# Patient Record
Sex: FEMALE | Race: WHITE | NOT HISPANIC OR LATINO | Employment: OTHER | URBAN - METROPOLITAN AREA
[De-identification: names, ages, dates, MRNs, and addresses within clinical notes are randomized per-mention and may not be internally consistent; named-entity substitution may affect disease eponyms.]

---

## 2017-01-05 ENCOUNTER — OFFICE VISIT (OUTPATIENT)
Dept: CARDIOLOGY | Facility: MEDICAL CENTER | Age: 72
End: 2017-01-05
Payer: MEDICARE

## 2017-01-05 VITALS — HEART RATE: 70 BPM | OXYGEN SATURATION: 96 % | BODY MASS INDEX: 25.3 KG/M2 | HEIGHT: 66 IN | WEIGHT: 157.4 LBS

## 2017-01-05 DIAGNOSIS — I49.3 VENTRICULAR PREMATURE BEATS: ICD-10-CM

## 2017-01-05 DIAGNOSIS — R07.89 ATYPICAL CHEST PAIN: ICD-10-CM

## 2017-01-05 DIAGNOSIS — R00.2 PALPITATIONS: Chronic | ICD-10-CM

## 2017-01-05 DIAGNOSIS — I49.1 ATRIAL PREMATURE BEATS: ICD-10-CM

## 2017-01-05 PROBLEM — J43.9 PULMONARY EMPHYSEMA (HCC): Status: ACTIVE | Noted: 2017-01-05

## 2017-01-05 PROBLEM — M79.7 FIBROMYALGIA: Status: ACTIVE | Noted: 2017-01-05

## 2017-01-05 PROBLEM — G47.33 OSA (OBSTRUCTIVE SLEEP APNEA): Status: ACTIVE | Noted: 2017-01-05

## 2017-01-05 PROBLEM — J44.9 COPD (CHRONIC OBSTRUCTIVE PULMONARY DISEASE) (HCC): Status: ACTIVE | Noted: 2017-01-05

## 2017-01-05 PROCEDURE — G8427 DOCREV CUR MEDS BY ELIG CLIN: HCPCS | Performed by: INTERNAL MEDICINE

## 2017-01-05 PROCEDURE — 3017F COLORECTAL CA SCREEN DOC REV: CPT | Performed by: INTERNAL MEDICINE

## 2017-01-05 PROCEDURE — G8420 CALC BMI NORM PARAMETERS: HCPCS | Performed by: INTERNAL MEDICINE

## 2017-01-05 PROCEDURE — 1036F TOBACCO NON-USER: CPT | Performed by: INTERNAL MEDICINE

## 2017-01-05 PROCEDURE — 99214 OFFICE O/P EST MOD 30 MIN: CPT | Performed by: INTERNAL MEDICINE

## 2017-01-05 RX ORDER — ATORVASTATIN CALCIUM 10 MG/1
10 TABLET, FILM COATED ORAL NIGHTLY
COMMUNITY

## 2017-01-05 ASSESSMENT — ENCOUNTER SYMPTOMS
SHORTNESS OF BREATH: 0
MYALGIAS: 1
PALPITATIONS: 1

## 2017-01-05 NOTE — MR AVS SNAPSHOT
"Lindsey Bella   2017 10:45 AM   Office Visit   MRN: [id number]    Department:  [facility]   Dept Phone:  [phone]    Description:  Female : 1945   Provider:  Wilfred Castanon M.D.           Reason for Visit     Follow-Up           Allergies as of 2017     No Known Allergies      You were diagnosed with     Atypical chest pain   [438522]       Ventricular premature beats   [746540]       Palpitations   [785.1.ICD-9-CM]       Atrial premature beats   [955462]         Vital Signs     Pulse Height Weight Body Mass Index Oxygen Saturation Smoking Status    70 1.676 m (5' 5.98\") 71.396 kg (157 lb 6.4 oz) 25.42 kg/m2 96% Former Smoker      Basic Information     Date Of Birth Sex Race Ethnicity Preferred Language    1945 Female White Non- English      Your appointments     2017 12:40 PM   PREVIOUS PATIENT with JOSE Donaldson   [facility] (--)    [address], [address]  [facility] [address]   [phone]              Problem List              ICD-10-CM Priority Class Noted - Resolved    Hypertension (Chronic) I10   3/27/2012 - Present    Hyperlipidemia (Chronic) E78.5   3/27/2012 - Present    GERD (gastroesophageal reflux disease) (Chronic) K21.9   3/27/2012 - Present    Family history of colon cancer (Chronic) Z80.0   3/27/2012 - Present    Hot flashes, menopausal (Chronic) N95.1   3/27/2012 - Present    Allergic rhinitis (Chronic)    3/27/2012 - Present    Palpitations (Chronic) R00.2   3/27/2012 - Present    Panic disorder (Chronic) F41.0   3/27/2012 - Present    Routine health maintenance (Chronic) Z00.00   3/27/2012 - Present    Glaucoma (Chronic) H40.9   3/27/2012 - Present    Personal history of fibromyalgia Z87.39   2012 - Present    History of fracture of ankle Z87.81   1/3/2013 - Present    Family history of early CAD Z82.49   1/3/2014 - Present    Abnormal stress ECG with treadmill R94.39   " 2/26/2014 - Present    Abnormal stress echocardiogram R94.39   2/28/2014 - Present    Atrial premature beats I49.1   2/28/2014 - Present    Ventricular premature beats I49.3   2/28/2014 - Present    Atypical chest pain R07.89   1/5/2017 - Present    Pulmonary emphysema (HCC) J43.9   1/5/2017 - Present    COPD (chronic obstructive pulmonary disease) (HCC) J44.9   1/5/2017 - Present    JOSSE (obstructive sleep apnea) G47.33   1/5/2017 - Present    Fibromyalgia M79.7   1/5/2017 - Present      Health Maintenance        Date Due Completion Dates    IMM DTaP/Tdap/Td Vaccine (1 - Tdap) 7/11/1964 ---    PAP SMEAR 7/11/1966 ---    IMM PNEUMOCOCCAL 65+ (ADULT) LOW/MEDIUM RISK SERIES (2 of 2 - PCV13) 3/27/2013 3/27/2012    MAMMOGRAM 4/29/2014 4/29/2013 (Done), 4/29/2013 (Done), 3/14/2012 (Done)    Override on 4/29/2013: Done    Override on 4/29/2013: Done    Override on 3/14/2012: Done (Dr Adames)    IMM INFLUENZA (1) 9/1/2016 ---    BONE DENSITY 3/14/2017 3/14/2012 (Done)    Override on 3/14/2012: Done    COLONOSCOPY 10/14/2017 10/14/2012 (Done)    Override on 10/14/2012: Done (repeat in 5 years/family history of colon cancer/Dr Edwards)            Current Immunizations     Pneumococcal polysaccharide vaccine (PPSV-23) 3/27/2012    SHINGLES VACCINE 3/27/2012      Below and/or attached are the medications your provider expects you to take. Review all of your home medications and newly ordered medications with your provider and/or pharmacist. Follow medication instructions as directed by your provider and/or pharmacist. Please keep your medication list with you and share with your provider. Update the information when medications are discontinued, doses are changed, or new medications (including over-the-counter products) are added; and carry medication information at all times in the event of emergency situations     Allergies:  No Known Allergies          Medications  Valid as of: January 05, 2017 - 11:37 AM    Generic Name Brand  Name Tablet Size Instructions for use    ALPRAZolam (Tab) XANAX 0.5 MG Take 1 Tab by mouth 1 time daily as needed for Anxiety.        Atorvastatin Calcium (Tab) LIPITOR 10 MG Take 10 mg by mouth every evening.        Bimatoprost   205 mg by Ophthalmic route every day. Right eye         Bimatoprost (Solution) LUMIGAN 0.01 %         Calcium Carbonate   Take  by mouth.        Cholecalciferol (Cap) Vitamin D-3 1000 UNITS Take 2,000 Units by mouth.        Coenzyme Q10 (Cap) Coenzyme Q10 100 MG Take  by mouth.        Estradiol (PATCH WEEKLY) CLIMARA 0.025 MG/24HR Apply 1 Patch to skin as directed every 7 days.        Magnesium   Take 350 mg by mouth.        Omega-3 Fatty Acids (Cap) OMEGA 3 FA 1000 MG Take 1,000 mg by mouth.        Pantoprazole Sodium (Tablet Delayed Response) PROTONIX 40 MG Take 1 Tab by mouth every day.        Progesterone Micronized (Cap) PROMETRIUM 100 MG Take 100 mg by mouth every day.        .                 Medicines prescribed today were sent to:     [id number] - [place], [place] - [address] [place] [address] AT [address]    [address] [place] [id number]    Phone: [phone] Fax: [phone]    Open 24 Hours?: No      Medication refill instructions:       If your prescription bottle indicates you have medication refills left, it is not necessary to call your provider’s office. Please contact your pharmacy and they will refill your medication.    If your prescription bottle indicates you do not have any refills left, you may request refills at any time through one of the following ways: The online [employer] system (except Urgent Care), by calling your provider’s office, or by asking your pharmacy to contact your provider’s office with a refill request. Medication refills are processed only during regular business hours and may not be available until the next business day. Your provider may request additional information or to have a follow-up visit with you prior to refilling your  medication.   *Please Note: Medication refills are assigned a new Rx number when refilled electronically. Your pharmacy may indicate that no refills were authorized even though a new prescription for the same medication is available at the pharmacy. Please request the medicine by name with the pharmacy before contacting your provider for a refill.           [email or web address] Access Code: Activation code not generated  Current [employer] Status: Active

## 2017-01-05 NOTE — PROGRESS NOTES
"Subjective:   Lindsey Bella is a 68 y.o. female who presents today follow-up of atypical chest pain hyperlipidemia and history of palpitations and PVCs.    Last seen on 3/9/2016.    States that she had a \"stressful year\".  Had a variety of personal issues and events that have occurred.  This also included injuring her right shoulder for which she is now getting physical therapy.  During the stressful episodes she had some flareup of her \"palpitations\".  Palpitations lasts seconds and then resolved.  There a bit distressing for her.  She's had them for 10 years.  They are improved since the holidays are over.  Also reports a variety of aches and tender chest pain with her statin therapy.  Did not tolerate simvastatin 5 mg.  Went back on Lipitor 10 mg daily then decreased to every other day which improved her symptoms but they continue.    Past medical history  At 3/9/2016 appointment the patient who has suffered from symptoms of \"fibromyalgia\" with a variety of chest pains saw a chiropractor who practiced whole-body health.  She decided to stop Lipitor 10 mg daily which she had taken for years.  Her chest pain symptoms resolved.  Recently saw Dr. Victor, her PCP.  Repeat lipid panel showed marked elevation of her cholesterol off Lipitor.  Simvastatin 5 mg daily was restarted and she had a repeat lipid panel yesterday.  She's having no side effects from simvastatin.  She notes that her younger sister who is a year younger than she is has had 2 heart attacks and has required 2 stents recently.  Otherwise she walks daily with her dog without problems.  She takes her home blood pressure measurements which are normal. She otherwise has white coat syndrome and refuses to take her blood pressure in the office.    Past medical history  The patient has a history of chronic palpitations, adult situational stress syndrome, hypertension, hyperlipidemia family history of premature coronary disease.    The patient " originally was seen in the office on 1/3/2014 for palpitations.  On 1/9/2014 a coronary calcification scan was abnormal with a score of 14.  On 1/20/2014 the stress echocardiogram was done which showed dilatation of the left ventricle post exercise, exercise-induced wide QRS tachycardia suggestive of ventricular tachycardia and borderline positive stress induced ST segment depression. The patient is asymptomatic for angina pectoris.  She was prescribed metoprolol but decided not to take it and seek a second opinion.    The patient has had chronic palpitations for years.  The patient had been followed on an annual basis by cardiologist Dr. Soto for hyperlipidemia for many years when she lived in [place].  She had an annual exercise stress test for many years.  One-year, several years ago, the stress test showed an abnormality.  The patient underwent a nuclear exercise stress test which was normal.  The patient continued to be monitored annually until she moved to [place] 2-3 years ago.    The patient previously been under a lot of stress over the years some of which was related to divorce.  She does suffer from chronic palpitations.  Recent research that she undertook resulted in her starting magnesium supplements.  Her palpitations fully resolved.    The patient exercises regularly.  Patient denies angina pectoris.    Past Medical History   Diagnosis Date   • Hypertension 3/27/2012   • Hyperlipidemia 3/27/2012   • GERD (gastroesophageal reflux disease) 3/27/2012   • Hot flashes, menopausal 3/27/2012   • Palpitations 3/27/2012   • Panic disorder 3/27/2012   • Glaucoma 3/27/2012   • Anesthesia      PONV   • Urinary bladder disorder      frequency   • Arrhythmia      occasional arrythmia, evaluated by cardiologist and told not an issue   • Heart burn    • Pain      left ankle   • History of fracture of ankle 1/3/2013   • Family history of early CAD 1/3/2014   • Fibromyalgia 2007     Past Surgical History   Procedure  Laterality Date   • Cholecystectomy  1998     laparoscopic   • Tonsillectomy  as child   • Appendectomy  as child   • Eye surgery  as child; 2007     right eye injury as child   • Ankle orif  1/3/2013     Performed by Js Zuniga M.D. at [facility]     Family History   Problem Relation Age of Onset   • Cancer Mother      colon   • Lung Disease Father    • Cancer Maternal Aunt      colon   • Stroke Brother    • Heart Attack Brother      History   Smoking status   • Former Smoker -- 1.00 packs/day for 20 years   • Quit date: 01/01/2007   Smokeless tobacco   • Never Used     Comment: quit long time ago      No Known Allergies  Outpatient Encounter Prescriptions as of 1/5/2017   Medication Sig Dispense Refill   • atorvastatin (LIPITOR) 10 MG Tab Take 10 mg by mouth every evening.     • Calcium Carbonate (CALCIUM 600 PO) Take  by mouth.     • LUMIGAN 0.01 % Solution   4   • MAGNESIUM PO Take 350 mg by mouth.     • estradiol (CLIMARA) 0.025 MG/24HR PTWK Apply 1 Patch to skin as directed every 7 days.     • docosahexanoic acid (OMEGA 3 FA) 1000 MG CAPS Take 1,000 mg by mouth.     • Cholecalciferol (VITAMIN D-3) 1000 UNITS CAPS Take 2,000 Units by mouth.     • Coenzyme Q10 (COQ10) 100 MG CAPS Take  by mouth.     • pantoprazole (PROTONIX) 40 MG TBEC Take 1 Tab by mouth every day. 30 Each 0   • Bimatoprost (LUMIGAN OP) 205 mg by Ophthalmic route every day. Right eye      • alprazolam (XANAX) 0.5 MG TABS Take 1 Tab by mouth 1 time daily as needed for Anxiety. 30 Each 2   • progesterone (PROMETRIUM) 100 MG CAPS Take 100 mg by mouth every day.     • [DISCONTINUED] simvastatin (ZOCOR) 5 MG Tab Take 5 mg by mouth every evening. For cholesterol  5     No facility-administered encounter medications on file as of 1/5/2017.     Review of Systems   Respiratory: Negative for shortness of breath.    Cardiovascular: Positive for chest pain and palpitations.   Musculoskeletal: Positive for myalgias.        Objective:  "  Pulse 70  Ht 1.676 m (5' 5.98\")  Wt 71.396 kg (157 lb 6.4 oz)  BMI 25.42 kg/m2  SpO2 96%    Physical Exam   Constitutional: She is oriented to person, place, and time. She appears well-nourished. No distress.   HENT:   Head: Normocephalic.   Eyes: EOM are normal.   Neck: No JVD present. Carotid bruit is not present. No thyromegaly present.   Normal jugular venous pressure.   Cardiovascular: Normal rate, regular rhythm, S1 normal and S2 normal.  Exam reveals no gallop and no friction rub.    No murmur heard.  Pulses:       Carotid pulses are 2+ on the right side, and 2+ on the left side.       Radial pulses are 2+ on the right side, and 2+ on the left side.        Femoral pulses are 2+ on the right side, and 2+ on the left side.       Posterior tibial pulses are 2+ on the right side, and 2+ on the left side.   No femoral bruits.   Pulmonary/Chest: Effort normal and breath sounds normal. She has no wheezes. She has no rhonchi. She has no rales.   Abdominal: Soft. Bowel sounds are normal. She exhibits no abdominal bruit, no pulsatile midline mass and no mass. There is no hepatosplenomegaly. There is no tenderness.   Musculoskeletal: She exhibits no edema.   Neurological: She is alert and oriented to person, place, and time. She has normal strength. Gait normal.   Skin: Skin is warm and dry. No cyanosis. Nails show no clubbing.   Psychiatric: She has a normal mood and affect. Her behavior is normal.     1/09/2014 Coronary CT Calcification scan  1. THERE IS DEFINITE, AT LEAST MILD ATHEROSCLEROTIC PLAQUE BURDEN PRESENT.    2. MINIMAL TO MILD CORONARY STENOSES ARE LIKELY.    3. THE PATIENT?S CARDIOVASCULAR RISK IS MODERATE.    4. CARDIOVASCULAR RISK FACTOR MODIFICATION IS SUGGESTED.    5. FURTHER EVALUATION SHOULD BE BASED UPON GLOBAL ASSESSMENT OF CARDIOVASCULAR RISK FACTORS IN ADDITION TO THE RESULTS OF THIS TEST.    01/03/2014 EKG: Normal sinus rhythm, rate 80. Nonspecific ST-T wave abnormalities inferior and " lateral leads. Reviewed by myself.    2014 [facility] normal.    Assessment:     1. Atypical chest pain     2. Ventricular premature beats     3. Palpitations     4. Atrial premature beats         Medical Decision Making:  Today's Assessment / Status / Plan:   Hyperlipidemia. Myalgias or arthralgias and various chest pains on simvastatin and now on Lipitor 10 mg every other day.    Myalgias, arthralgias. Possibly statin induced.    History of chronic tobacco use.    COPD. Mild. Seen [facility] pulmonology.    Obstructive sleep apnea. On oxygen.    History of palpitations. Resolved.      History of PACs and PVCs.    White coat syndrome for hypertension. Normal home blood pressure measurements.     Abnormal coronary calcification scan.    Recommendations  Discontinue atorvastatin.  The patient is to report back whether this improves her symptoms of myalgias and arthralgias in one month.  If so than would refer her for [facility] 9 therapy.  I reviewed her blood pressure measurements which overall are inadequate control.  Follow-up one year.

## 2017-01-05 NOTE — PROGRESS NOTES
Subjective:   Lindsey Bella is a 71 y.o. female who presents today     Past Medical History   Diagnosis Date   • Hypertension 3/27/2012   • Hyperlipidemia 3/27/2012   • GERD (gastroesophageal reflux disease) 3/27/2012   • Hot flashes, menopausal 3/27/2012   • Palpitations 3/27/2012   • Panic disorder 3/27/2012   • Glaucoma 3/27/2012   • Anesthesia      PONV   • Urinary bladder disorder      frequency   • Arrhythmia      occasional arrythmia, evaluated by cardiologist and told not an issue   • Heart burn    • Pain      left ankle   • History of fracture of ankle 1/3/2013   • Family history of early CAD 1/3/2014     Past Surgical History   Procedure Laterality Date   • Cholecystectomy  1998     laparoscopic   • Tonsillectomy  as child   • Appendectomy  as child   • Eye surgery  as child; 2007     right eye injury as child   • Ankle orif  1/3/2013     Performed by Js Zuniga M.D. at [facility] ORS     Family History   Problem Relation Age of Onset   • Cancer Mother      colon   • Lung Disease Father    • Cancer Maternal Aunt      colon   • Stroke Brother    • Heart Attack Brother      History   Smoking status   • Former Smoker -- 1.00 packs/day for 20 years   • Quit date: 01/01/2007   Smokeless tobacco   • Never Used     Comment: quit long time ago      No Known Allergies  Outpatient Encounter Prescriptions as of 1/5/2017   Medication Sig Dispense Refill   • atorvastatin (LIPITOR) 10 MG Tab Take 10 mg by mouth every evening.     • Calcium Carbonate (CALCIUM 600 PO) Take  by mouth.     • LUMIGAN 0.01 % Solution   4   • MAGNESIUM PO Take 350 mg by mouth.     • estradiol (CLIMARA) 0.025 MG/24HR PTWK Apply 1 Patch to skin as directed every 7 days.     • docosahexanoic acid (OMEGA 3 FA) 1000 MG CAPS Take 1,000 mg by mouth.     • Cholecalciferol (VITAMIN D-3) 1000 UNITS CAPS Take 2,000 Units by mouth.     • Coenzyme Q10 (COQ10) 100 MG CAPS Take  by mouth.     • pantoprazole (PROTONIX) 40 MG TBEC  "Take 1 Tab by mouth every day. 30 Each 0   • Bimatoprost (LUMIGAN OP) 205 mg by Ophthalmic route every day. Right eye      • alprazolam (XANAX) 0.5 MG TABS Take 1 Tab by mouth 1 time daily as needed for Anxiety. 30 Each 2   • progesterone (PROMETRIUM) 100 MG CAPS Take 100 mg by mouth every day.     • simvastatin (ZOCOR) 5 MG Tab Take 5 mg by mouth every evening. For cholesterol  5     No facility-administered encounter medications on file as of 1/5/2017.     ROS     Objective:   Pulse 70  Ht 1.676 m (5' 5.98\")  Wt 71.396 kg (157 lb 6.4 oz)  BMI 25.42 kg/m2  SpO2 96%    Physical Exam    Assessment:   No diagnosis found.    Medical Decision Making:  Today's Assessment / Status / Plan:     Lindsey Bella is a 68 y.o. female who presents today follow-up of atypical chest pain hyperlipidemia history of palpitations and PVCs.    Last seen on 2/26/2014.    More recently the patient who is suffered from symptoms of \"fibromyalgia\" with a variety of chest pains saw a chiropractor who practiced whole-body health.  She decided to stop Lipitor 10 mg daily which she had taken for years.  Her chest pain symptoms resolved.  Recently saw Dr. Victor, her PCP.  Repeat lipid panel showed marked elevation of her cholesterol off Lipitor.  Simvastatin 5 mg daily was restarted and she had a repeat lipid panel yesterday.  She's having no side effects from simvastatin.  She notes that her younger sister who is a year younger than she is has had 2 heart attacks and has required 2 stents recently.  Otherwise she walks daily with her dog without problems.  She takes her home blood pressure measurements which are normal. She otherwise has white coat syndrome and refuses to take her blood pressure in the office.    Past medical history  The patient has a history of chronic palpitations, adult situational stress syndrome, hypertension, hyperlipidemia family history of premature coronary disease.    The patient originally was seen in " the office on 1/3/2014 for palpitations.  On 1/9/2014 a coronary calcification scan was abnormal with a score of 14.  On 1/20/2014 the stress echocardiogram was done which showed dilatation of the left ventricle post exercise, exercise-induced wide QRS tachycardia suggestive of ventricular tachycardia and borderline positive stress induced ST segment depression. The patient is asymptomatic for angina pectoris.  She was prescribed metoprolol but decided not to take it and seek a second opinion.    The patient has had chronic palpitations for years.  The patient had been followed on an annual basis by cardiologist Dr. Soto for hyperlipidemia for many years when she lived in [place].  She had an annual exercise stress test for many years.  One-year, several years ago, the stress test showed an abnormality.  The patient underwent a nuclear exercise stress test which was normal.  The patient continued to be monitored annually until she moved to [place] 2-3 years ago.    The patient previously been under a lot of stress over the years some of which was related to divorce.  She does suffer from chronic palpitations.  Recent research that she undertook resulted in her starting magnesium supplements.  Her palpitations fully resolved.    The patient exercises regularly.  Patient denies angina pectoris.     Past Medical History    Past Medical History    Diagnosis  Date    •  Hypertension  3/27/2012    •  Hyperlipidemia  3/27/2012    •  GERD (gastroesophageal reflux disease)  3/27/2012    •  Hot flashes, menopausal  3/27/2012    •  Palpitations  3/27/2012    •  Panic disorder  3/27/2012    •  Glaucoma  3/27/2012    •  Anesthesia       PONV    •  Urinary bladder disorder       frequency    •  Arrhythmia       occasional arrythmia, evaluated by cardiologist and told not an issue    •  Heart burn     •  Pain       left ankle    •  History of fracture of ankle  1/3/2013    •  Family history of early CAD  1/3/2014          Past  Surgical History    Past Surgical History    Procedure  Date    •  Cholecystectomy  1998      laparoscopic    •  Tonsillectomy  as child    •  Appendectomy  as child    •  Eye surgery  as child; 2007      right eye injury as child    •  Ankle orif  1/3/2013      Performed by Js Zuniga M.D. at SURGERY [facility]          Family History    Family History    Problem  Relation  Age of Onset    •  Cancer  Mother       colon    •  Lung Disease  Father     •  Cancer  Maternal Aunt       colon    •  Stroke  Brother     •  Heart Attack  Brother          History    Smoking status    •  Former Smoker -- 1.0 packs/day for 20 years    •  Quit date:  01/01/2007    Smokeless tobacco    •  Never Used      Comment: quit long time ago       No Known Allergies   Encounter Medications    Outpatient Encounter Prescriptions as of 2/26/2014    Medication  Sig  Dispense  Refill    •  aspirin EC (ECOTRIN) 81 MG TBEC  Take 81 mg by mouth every day.      •  MAGNESIUM PO  Take 350 mg by mouth.      •  DISCONTD: metoprolol SR (TOPROL XL) 25 MG TB24  Take 1 Tab by mouth every day.  90 Tab  3    •  estradiol (CLIMARA) 0.025 MG/24HR PTWK  Apply 1 Patch to skin as directed every 7 days.      •  atorvastatin (LIPITOR) 10 MG TABS  Take 10 mg by mouth every evening.      •  docosahexanoic acid (OMEGA 3 FA) 1000 MG CAPS  Take 1,000 mg by mouth.      •  Cholecalciferol (VITAMIN D-3) 1000 UNITS CAPS  Take 2,000 Units by mouth.      •  Coenzyme Q10 (COQ10) 100 MG CAPS  Take by mouth.      •  pantoprazole (PROTONIX) 40 MG TBEC  Take 1 Tab by mouth every day.  30 Each  0    •  DORZOLAMIDE HCL-TIMOLOL MAL OP  by Ophthalmic route 2 Times a Day. Right eye       •  Bimatoprost (LUMIGAN OP)  205 mg by Ophthalmic route every day. Right eye       •  alprazolam (XANAX) 0.5 MG TABS  Take 1 Tab by mouth 1 time daily as needed for Anxiety.  30 Each  2    •  progesterone (PROMETRIUM) 100 MG CAPS  Take 100 mg by mouth every day.           Review of Systems  "  Cardiovascular: Negative for orthopnea, claudication, leg swelling and PND.   Musculoskeletal: Negative for myalgias.   Neurological: Negative for dizziness and loss of consciousness.     Objective:    /90  Pulse 69  Ht 5' 6\" (167.6 cm)  Wt 149 lb (67.586 kg)  BMI 24.05 kg/m2  SpO2 94%    Physical Exam   Constitutional: She is oriented to person, place, and time. She appears well-nourished. No distress.   HENT:   Head: Normocephalic.   Mouth/Throat: Oropharynx is clear and moist and mucous membranes are normal.   Eyes: Conjunctivae normal and EOM are normal. Pupils are equal, round, and reactive to light. No scleral icterus.   Neck: Carotid bruit is not present. No thyromegaly present.   Normal jugular venous pressure.   Cardiovascular: Normal rate, regular rhythm, S1 normal and S2 normal. Exam reveals no gallop and no friction rub.   Murmur heard.  Systolic murmur is present with a grade of 1/6   Pulses:  Carotid pulses are 2+ on the right side, and 2+ on the left side.  Radial pulses are 2+ on the right side, and 2+ on the left side.   Femoral pulses are 2+ on the right side, and 2+ on the left side.  Posterior tibial pulses are 2+ on the right side, and 2+ on the left side.   No femoral bruits.   Pulmonary/Chest: Effort normal and breath sounds normal. She has no wheezes. She has no rhonchi. She has no rales.   Abdominal: Soft. Bowel sounds are normal. She exhibits no abdominal bruit, no pulsatile midline mass and no mass. There is no hepatosplenomegaly. There is no tenderness.   Musculoskeletal: She exhibits no edema.   Lymphadenopathy:   She has no cervical adenopathy.   Neurological: She is alert and oriented to person, place, and time. She has normal strength. Gait normal.   Skin: Skin is warm and dry. No cyanosis. Nails show no clubbing.   Psychiatric: She has a normal mood and affect. Her behavior is normal.     Assessment:    1.  Abnormal stress ECG with treadmill   TREADMILL MPI    2.  " Abnormal stress echocardiogram      3.  Atrial premature beats      4.  Ventricular premature beats      5.  Hypertension      6.  Hyperlipidemia      7.  Palpitations          Ref. Range  4/10/2013 08:39  4/10/2013 08:39  10/28/2013 07:52  12/2/2013 08:31    Cholesterol,Tot  Latest Range: 100-199 mg/dL  162  171  261 (H)  141    Triglycerides  Latest Range: 0-149 mg/dL  98  93  122  103    HDL  Latest Range: >=40 mg/dL  46  56  47  38 (A)    LDL  Latest Range: <100 mg/dL  96   190 (H)  82      01/09/2014 Coronary CT Calcification scan  1. THERE IS DEFINITE, AT LEAST MILD ATHEROSCLEROTIC PLAQUE BURDEN PRESENT.    2. MINIMAL TO MILD CORONARY STENOSES ARE LIKELY.    3. THE PATIENT?S CARDIOVASCULAR RISK IS MODERATE.    4. CARDIOVASCULAR RISK FACTOR MODIFICATION IS SUGGESTED.    5. FURTHER EVALUATION SHOULD BE BASED UPON GLOBAL ASSESSMENT OF CARDIOVASCULAR RISK FACTORS IN ADDITION TO THE RESULTS OF THIS TEST.    01/03/2014 EKG: Normal sinus rhythm, rate 80. Nonspecific ST-T wave abnormalities inferior and lateral leads. Reviewed by myself.    2014 MPI normal.  Medical Decision Making: Today's Assessment / Status / Plan:      Hyperlipidemia. Improved on simvastatin 5 mg daily.    History of chronic tobacco use.    COPD. Mild. Seen [facility] pulmonology.    History of palpitations. Resolved.      White coat syndrome for hypertension. Normal home blood pressure measurements.     Abnormal coronary calcification scan.     6. Abnormal coronary calcification scan.    Recommendations  Add aspirin 81 mg daily.  Continue simvastatin 5 mg daily.  Continue exercise program.  Follow-up one year sooner if necessary

## 2017-02-13 ENCOUNTER — HOSPITAL ENCOUNTER (OUTPATIENT)
Dept: LAB | Facility: MEDICAL CENTER | Age: 72
End: 2017-02-13
Attending: FAMILY MEDICINE
Payer: MEDICARE

## 2017-02-13 LAB
25(OH)D3 SERPL-MCNC: 42 NG/ML (ref 30–100)
ALBUMIN SERPL BCP-MCNC: 4 G/DL (ref 3.2–4.9)
ALBUMIN/GLOB SERPL: 1.6 G/DL
ALP SERPL-CCNC: 49 U/L (ref 30–99)
ALT SERPL-CCNC: 18 U/L (ref 2–50)
ANION GAP SERPL CALC-SCNC: 7 MMOL/L (ref 0–11.9)
AST SERPL-CCNC: 17 U/L (ref 12–45)
BASOPHILS # BLD AUTO: 0.08 K/UL (ref 0–0.12)
BASOPHILS NFR BLD AUTO: 2 % (ref 0–1.8)
BILIRUB SERPL-MCNC: 0.5 MG/DL (ref 0.1–1.5)
BUN SERPL-MCNC: 13 MG/DL (ref 8–22)
CALCIUM SERPL-MCNC: 8.9 MG/DL (ref 8.5–10.5)
CHLORIDE SERPL-SCNC: 105 MMOL/L (ref 96–112)
CHOLEST SERPL-MCNC: 234 MG/DL (ref 100–199)
CO2 SERPL-SCNC: 27 MMOL/L (ref 20–33)
CREAT SERPL-MCNC: 0.57 MG/DL (ref 0.5–1.4)
EOSINOPHIL # BLD: 0.11 K/UL (ref 0–0.51)
EOSINOPHIL NFR BLD AUTO: 2.7 % (ref 0–6.9)
ERYTHROCYTE [DISTWIDTH] IN BLOOD BY AUTOMATED COUNT: 44.2 FL (ref 35.9–50)
GLOBULIN SER CALC-MCNC: 2.5 G/DL (ref 1.9–3.5)
GLUCOSE SERPL-MCNC: 86 MG/DL (ref 65–99)
HCT VFR BLD AUTO: 46.2 % (ref 37–47)
HCV AB S/CO SERPL IA: NEGATIVE
HDLC SERPL-MCNC: 47 MG/DL
HGB BLD-MCNC: 14.7 G/DL (ref 12–16)
IMM GRANULOCYTES # BLD AUTO: 0 K/UL (ref 0–0.11)
IMM GRANULOCYTES NFR BLD AUTO: 0 % (ref 0–0.9)
LDLC SERPL CALC-MCNC: 170 MG/DL
LYMPHOCYTES # BLD: 1.41 K/UL (ref 1–4.8)
LYMPHOCYTES NFR BLD AUTO: 34.6 % (ref 22–41)
MCH RBC QN AUTO: 30.4 PG (ref 27–33)
MCHC RBC AUTO-ENTMCNC: 31.8 G/DL (ref 33.6–35)
MCV RBC AUTO: 95.5 FL (ref 81.4–97.8)
MONOCYTES # BLD: 0.29 K/UL (ref 0–0.85)
MONOCYTES NFR BLD AUTO: 7.1 % (ref 0–13.4)
NEUTROPHILS # BLD: 2.19 K/UL (ref 2–7.15)
NEUTROPHILS NFR BLD AUTO: 53.6 % (ref 44–72)
NRBC # BLD AUTO: 0 K/UL
NRBC BLD-RTO: 0 /100 WBC
PLATELET # BLD AUTO: 263 K/UL (ref 164–446)
PMV BLD AUTO: 10.7 FL (ref 9–12.9)
POTASSIUM SERPL-SCNC: 4.1 MMOL/L (ref 3.6–5.5)
PROT SERPL-MCNC: 6.5 G/DL (ref 6–8.2)
RBC # BLD AUTO: 4.84 M/UL (ref 4.2–5.4)
SODIUM SERPL-SCNC: 139 MMOL/L (ref 135–145)
TRIGL SERPL-MCNC: 83 MG/DL (ref 0–149)
TSH SERPL DL<=0.005 MIU/L-ACNC: 2.19 UIU/ML (ref 0.3–3.7)
WBC # BLD AUTO: 4.1 K/UL (ref 4.8–10.8)

## 2017-02-13 PROCEDURE — 80053 COMPREHEN METABOLIC PANEL: CPT

## 2017-02-13 PROCEDURE — 84443 ASSAY THYROID STIM HORMONE: CPT

## 2017-02-13 PROCEDURE — 80061 LIPID PANEL: CPT

## 2017-02-13 PROCEDURE — 86803 HEPATITIS C AB TEST: CPT

## 2017-02-13 PROCEDURE — 36415 COLL VENOUS BLD VENIPUNCTURE: CPT

## 2017-02-13 PROCEDURE — 82306 VITAMIN D 25 HYDROXY: CPT

## 2017-02-13 PROCEDURE — 85025 COMPLETE CBC W/AUTO DIFF WBC: CPT

## 2017-02-16 ENCOUNTER — OFFICE VISIT (OUTPATIENT)
Dept: CARDIOLOGY | Facility: MEDICAL CENTER | Age: 72
End: 2017-02-16
Payer: MEDICARE

## 2017-02-16 VITALS
DIASTOLIC BLOOD PRESSURE: 88 MMHG | HEART RATE: 62 BPM | SYSTOLIC BLOOD PRESSURE: 142 MMHG | HEIGHT: 66 IN | BODY MASS INDEX: 25.07 KG/M2 | OXYGEN SATURATION: 96 % | WEIGHT: 156 LBS

## 2017-02-16 DIAGNOSIS — E78.2 MIXED HYPERLIPIDEMIA: Primary | ICD-10-CM

## 2017-02-16 DIAGNOSIS — R00.2 PALPITATIONS: Chronic | ICD-10-CM

## 2017-02-16 DIAGNOSIS — R07.89 ATYPICAL CHEST PAIN: ICD-10-CM

## 2017-02-16 DIAGNOSIS — I10 ESSENTIAL HYPERTENSION, BENIGN: ICD-10-CM

## 2017-02-16 DIAGNOSIS — M79.7 FIBROMYALGIA: ICD-10-CM

## 2017-02-16 PROCEDURE — 3014F SCREEN MAMMO DOC REV: CPT | Mod: 8P | Performed by: NURSE PRACTITIONER

## 2017-02-16 PROCEDURE — 99214 OFFICE O/P EST MOD 30 MIN: CPT | Performed by: NURSE PRACTITIONER

## 2017-02-16 PROCEDURE — 1101F PT FALLS ASSESS-DOCD LE1/YR: CPT | Mod: 8P | Performed by: NURSE PRACTITIONER

## 2017-02-16 PROCEDURE — G8432 DEP SCR NOT DOC, RNG: HCPCS | Performed by: NURSE PRACTITIONER

## 2017-02-16 PROCEDURE — 4040F PNEUMOC VAC/ADMIN/RCVD: CPT | Performed by: NURSE PRACTITIONER

## 2017-02-16 PROCEDURE — 3017F COLORECTAL CA SCREEN DOC REV: CPT | Performed by: NURSE PRACTITIONER

## 2017-02-16 PROCEDURE — G8484 FLU IMMUNIZE NO ADMIN: HCPCS | Performed by: NURSE PRACTITIONER

## 2017-02-16 PROCEDURE — 1036F TOBACCO NON-USER: CPT | Performed by: NURSE PRACTITIONER

## 2017-02-16 PROCEDURE — G8420 CALC BMI NORM PARAMETERS: HCPCS | Performed by: NURSE PRACTITIONER

## 2017-02-16 RX ORDER — ROSUVASTATIN CALCIUM 5 MG/1
5 TABLET, COATED ORAL EVERY EVENING
Qty: 30 TAB | Refills: 11 | Status: SHIPPED | OUTPATIENT
Start: 2017-02-16

## 2017-02-16 ASSESSMENT — ENCOUNTER SYMPTOMS
PND: 0
CLAUDICATION: 0
WEAKNESS: 0
DIZZINESS: 0
COUGH: 0
ABDOMINAL PAIN: 0
ORTHOPNEA: 0
PALPITATIONS: 1
SHORTNESS OF BREATH: 0
MYALGIAS: 0
BACK PAIN: 1

## 2017-02-16 NOTE — PROGRESS NOTES
"Subjective:   Lindsey \"Kyara\" Shayne is a 71 y.o. female who presents today to follow-up on atypical chest pain and hyperlipidemia. She's been complaining of pain in her upper chest bilaterally that is tender to the touch. Lipitor was stopped to see if this changed her chest discomfort. She thinks it may have improved slightly but the discomfort is still there. It is nonexertional.    She also complains of palpitations that she describes as a brief flipping sensation in her chest. This lasted only seconds. It tends to come when she is under stress. She is taking magnesium which she feels helps.     She tells me she has sleep apnea and wears oxygen at night. She was not a candidate for CPAP. She also has some mild COPD and fibromyalgia.    Past Medical History   Diagnosis Date   • Hypertension 3/27/2012   • Hyperlipidemia 3/27/2012   • GERD (gastroesophageal reflux disease) 3/27/2012   • Hot flashes, menopausal 3/27/2012   • Palpitations 3/27/2012   • Panic disorder 3/27/2012   • Glaucoma 3/27/2012   • Anesthesia      PONV   • Urinary bladder disorder      frequency   • Arrhythmia      occasional arrythmia, evaluated by cardiologist and told not an issue   • Heart burn    • Pain      left ankle   • History of fracture of ankle 1/3/2013   • Family history of early CAD 1/3/2014   • Fibromyalgia 2007     Past Surgical History   Procedure Laterality Date   • Cholecystectomy  1998     laparoscopic   • Tonsillectomy  as child   • Appendectomy  as child   • Eye surgery  as child; 2007     right eye injury as child   • Ankle orif  1/3/2013     Performed by Js Zuniga M.D. at [facility]     Family History   Problem Relation Age of Onset   • Cancer Mother      colon   • Lung Disease Father    • Cancer Maternal Aunt      colon   • Stroke Brother    • Heart Attack Brother      History   Smoking status   • Former Smoker -- 1.00 packs/day for 20 years   • Quit date: 01/01/2007   Smokeless tobacco   • Never " Used     Comment: quit long time ago      No Known Allergies  Outpatient Encounter Prescriptions as of 2/16/2017   Medication Sig Dispense Refill   • DORZOLAMIDE HCL-TIMOLOL MAL OP Place 1 Drop in right eye 2 Times a Day.     • rosuvastatin (CRESTOR) 5 MG Tab Take 1 Tab by mouth every evening. 30 Tab 11   • Calcium Carbonate (CALCIUM 600 PO) Take 1 Tab by mouth every day.     • MAGNESIUM PO Take 350 mg by mouth every day.     • estradiol (CLIMARA) 0.025 MG/24HR PTWK Apply 1 Patch to skin as directed every 7 days.     • docosahexanoic acid (OMEGA 3 FA) 1000 MG CAPS Take 1,000 mg by mouth every day.     • Cholecalciferol (VITAMIN D-3) 1000 UNITS CAPS Take 2,000 Units by mouth every day.     • Coenzyme Q10 (COQ10) 100 MG CAPS Take 1 Cap by mouth every day.     • pantoprazole (PROTONIX) 40 MG TBEC Take 1 Tab by mouth every day. 30 Each 0   • Bimatoprost (LUMIGAN OP) Place 1 Drop in right eye every day. Right eye     • alprazolam (XANAX) 0.5 MG TABS Take 1 Tab by mouth 1 time daily as needed for Anxiety. (Patient taking differently: 1 tablet in the AM and 1 1/2 tablets in the PM) 30 Each 2   • progesterone (PROMETRIUM) 100 MG CAPS Take 100 mg by mouth every day.     • atorvastatin (LIPITOR) 10 MG Tab Take 10 mg by mouth every evening.     • LUMIGAN 0.01 % Solution   4     No facility-administered encounter medications on file as of 2/16/2017.     Review of Systems   Constitutional: Negative for malaise/fatigue.   Respiratory: Negative for cough and shortness of breath.    Cardiovascular: Positive for chest pain (tender to the touch across the upper chest. Minimal change with stopping statin.) and palpitations (last only seconds. Worse with stress.). Negative for orthopnea, claudication, leg swelling and PND.   Gastrointestinal: Negative for abdominal pain.   Musculoskeletal: Positive for back pain (chronic low back pain.). Negative for myalgias.   Neurological: Negative for dizziness and weakness.        Objective:   BP  "142/88 mmHg  Pulse 62  Ht 1.676 m (5' 5.98\")  Wt 70.761 kg (156 lb)  BMI 25.19 kg/m2  SpO2 96%    Physical Exam   Constitutional: She is oriented to person, place, and time. She appears well-developed and well-nourished.   HENT:   Head: Normocephalic.   Eyes: Conjunctivae are normal.   Neck: No JVD present. No thyromegaly present.   Cardiovascular: Normal rate, regular rhythm, S1 normal, S2 normal and normal heart sounds.  Exam reveals no gallop, no S3, no S4 and no friction rub.    No murmur heard.  Pulmonary/Chest: Effort normal and breath sounds normal. No respiratory distress. She has no wheezes. She has no rales.   Abdominal: Soft. Bowel sounds are normal. She exhibits no distension. There is no tenderness.   Musculoskeletal: She exhibits no edema.   Neurological: She is alert and oriented to person, place, and time.   Skin: Skin is warm and dry.   Psychiatric: She has a normal mood and affect.     Results for THO TREVINO (MRN [id number]) as of 2/16/2017 09:27   Ref. Range 2/13/2017 07:16   WBC Latest Ref Range: 4.8-10.8 K/uL 4.1 (L)   RBC Latest Ref Range: 4.20-5.40 M/uL 4.84   Hemoglobin Latest Ref Range: 12.0-16.0 g/dL 14.7   Hematocrit Latest Ref Range: 37.0-47.0 % 46.2   MCV Latest Ref Range: 81.4-97.8 fL 95.5   MCH Latest Ref Range: 27.0-33.0 pg 30.4   MCHC Latest Ref Range: 33.6-35.0 g/dL 31.8 (L)   RDW Latest Ref Range: 35.9-50.0 fL 44.2   Platelet Count Latest Ref Range: 164-446 K/uL 263   MPV Latest Ref Range: 9.0-12.9 fL 10.7   Neutrophils-Polys Latest Ref Range: 44.00-72.00 % 53.60   Neutrophils (Absolute) Latest Ref Range: 2.00-7.15 K/uL 2.19   Lymphocytes Latest Ref Range: 22.00-41.00 % 34.60   Lymphs (Absolute) Latest Ref Range: 1.00-4.80 K/uL 1.41   Monocytes Latest Ref Range: 0.00-13.40 % 7.10   Monos (Absolute) Latest Ref Range: 0.00-0.85 K/uL 0.29   Eosinophils Latest Ref Range: 0.00-6.90 % 2.70   Eos (Absolute) Latest Ref Range: 0.00-0.51 K/uL 0.11   Basophils Latest Ref " Range: 0.00-1.80 % 2.00 (H)   Baso (Absolute) Latest Ref Range: 0.00-0.12 K/uL 0.08   Immature Granulocytes Latest Ref Range: 0.00-0.90 % 0.00   Immature Granulocytes (abs) Latest Ref Range: 0.00-0.11 K/uL 0.00   Nucleated RBC Latest Units: /100 WBC 0.00   NRBC (Absolute) Latest Units: K/uL 0.00   Sodium Latest Ref Range: 135-145 mmol/L 139   Potassium Latest Ref Range: 3.6-5.5 mmol/L 4.1   Chloride Latest Ref Range:  mmol/L 105   Co2 Latest Ref Range: 20-33 mmol/L 27   Anion Gap Latest Ref Range: 0.0-11.9  7.0   Glucose Latest Ref Range: 65-99 mg/dL 86   Bun Latest Ref Range: 8-22 mg/dL 13   Creatinine Latest Ref Range: 0.50-1.40 mg/dL 0.57   GFR If  Latest Ref Range: >60 mL/min/1.73 m 2 >60   GFR If Non  Latest Ref Range: >60 mL/min/1.73 m 2 >60   Calcium Latest Ref Range: 8.5-10.5 mg/dL 8.9   AST(SGOT) Latest Ref Range: 12-45 U/L 17   ALT(SGPT) Latest Ref Range: 2-50 U/L 18   Alkaline Phosphatase Latest Ref Range: 30-99 U/L 49   Total Bilirubin Latest Ref Range: 0.1-1.5 mg/dL 0.5   Albumin Latest Ref Range: 3.2-4.9 g/dL 4.0   Total Protein Latest Ref Range: 6.0-8.2 g/dL 6.5   Globulin Latest Ref Range: 1.9-3.5 g/dL 2.5   A-G Ratio Latest Units: g/dL 1.6   Cholesterol,Tot Latest Ref Range: 100-199 mg/dL 234 (H)   Triglycerides Latest Ref Range: 0-149 mg/dL 83   HDL Latest Ref Range: >=40 mg/dL 47   LDL Latest Ref Range: <100 mg/dL 170 (H)   25-Hydroxy   Vitamin D 25 Latest Ref Range:  ng/mL 42   TSH Latest Ref Range: 0.300-3.700 uIU/mL 2.190   Hepatitis C Antibody Latest Ref Range: Negative  Negative     Stress echo in 2014 was negative for ischemia. She did have PVCs and PACs.    Coronary calcium score in 2014 was 14 in the RCA distribution.    Assessment:     1. Mixed hyperlipidemia  rosuvastatin (CRESTOR) 5 MG Tab    COMP METABOLIC PANEL    LIPID PROFILE    CPK - TOTAL SERUM   2. Essential hypertension, benign     3. Palpitations     4. Atypical chest pain     5.  Fibromyalgia         Medical Decision Making:  Today's Assessment / Status / Plan:     Hyperlipidemia: Her LDL in particular is very elevated when she is off statin. She is willing to try Crestor 5 mg as she realizes she needs a reduction in her lipids. I will check CMP, lipids and CPK in 3 months. I do not think her chest discomfort is due to statin.    Hypertension: She tells me that she has white coat hypertension and her blood pressure is elevated in the office. Her blood pressure at home as been running 130/70. She took her blood pressure cuff to her primary care and it correlated. She is not on any antihypertensives and may not need them.    Palpitations: She has PACs and PVCs on her stress test. It sounds like she may be having ectopy for her palpitations. I have reassured her that these are benign.    Atypical chest pain: She is tender over her pectoralis minor muscles bilaterally. They're tender to the touch today and she has been off statin for a few months. This is clearly muscular chest discomfort and not cardiac. She should not require any other stress testing as she had a stress echo and a coronary calcium scan which were excellent.    Fibromyalgia: This is probably the reason she has the chest discomfort and other aches. It is probably not related to statin.    She will do lab work in 3 months to monitor her lipids. She will follow-up in one year with Dr. Castanon, sooner if problems.    Collaborating Provider: Dr. Castanon.

## 2017-02-16 NOTE — MR AVS SNAPSHOT
"        Lindsey Bella   2017 9:00 AM   Office Visit   MRN: [id number]    Department:  [facility]   Dept Phone:  [phone]    Description:  Female : 1945   Provider:  JOSE Donaldson           Reason for Visit     Follow-Up Here for a follow up. Patient has been off of Lipitor for 5 weeks now and labs were done on 17. Patient has white coat at visit and declined BP at visit.       Allergies as of 2017     No Known Allergies      You were diagnosed with     Mixed hyperlipidemia   [272.2.ICD-9-CM]  -  Primary     Essential hypertension, benign   [401.1.ICD-9-CM]       Palpitations   [785.1.ICD-9-CM]       Atypical chest pain   [447787]       Fibromyalgia   [817156]         Vital Signs     Blood Pressure Pulse Height Weight Body Mass Index Oxygen Saturation    142/88 mmHg 62 1.676 m (5' 5.98\") 70.761 kg (156 lb) 25.19 kg/m2 96%    Smoking Status                   Former Smoker           Basic Information     Date Of Birth Sex Race Ethnicity Preferred Language    1945 Female White Non- English      Problem List              ICD-10-CM Priority Class Noted - Resolved    Essential hypertension, benign I10   3/27/2012 - Present    Mixed hyperlipidemia E78.2   3/27/2012 - Present    GERD (gastroesophageal reflux disease) (Chronic) K21.9   3/27/2012 - Present    Family history of colon cancer (Chronic) Z80.0   3/27/2012 - Present    Allergic rhinitis (Chronic)    3/27/2012 - Present    Palpitations (Chronic) R00.2   3/27/2012 - Present    Panic disorder (Chronic) F41.0   3/27/2012 - Present    Routine health maintenance (Chronic) Z00.00   3/27/2012 - Present    Glaucoma (Chronic) H40.9   3/27/2012 - Present    Personal history of fibromyalgia Z87.39   2012 - Present    History of fracture of ankle Z87.81   1/3/2013 - Present    Abnormal stress ECG with treadmill R94.39   2014 - Present    Atrial premature beats I49.1   2014 - Present    Ventricular " premature beats I49.3   2/28/2014 - Present    Atypical chest pain R07.89   1/5/2017 - Present    Pulmonary emphysema (CMS-HCC) J43.9   1/5/2017 - Present    COPD (chronic obstructive pulmonary disease) (CMS-HCC) J44.9   1/5/2017 - Present    JOSSE (obstructive sleep apnea) G47.33   1/5/2017 - Present    Fibromyalgia M79.7   1/5/2017 - Present      Health Maintenance        Date Due Completion Dates    IMM DTaP/Tdap/Td Vaccine (1 - Tdap) 7/11/1964 ---    PAP SMEAR 7/11/1966 ---    IMM PNEUMOCOCCAL 65+ (ADULT) LOW/MEDIUM RISK SERIES (2 of 2 - PCV13) 3/27/2013 3/27/2012    MAMMOGRAM 4/29/2014 4/29/2013 (Done), 4/29/2013 (Done), 3/14/2012 (Done)    Override on 4/29/2013: Done    Override on 4/29/2013: Done    Override on 3/14/2012: Done (Dr Adames)    IMM INFLUENZA (1) 9/1/2016 ---    BONE DENSITY 3/14/2017 3/14/2012 (Done)    Override on 3/14/2012: Done    COLONOSCOPY 10/14/2017 10/14/2012 (Done)    Override on 10/14/2012: Done (repeat in 5 years/family history of colon cancer/Dr Edwards)            Current Immunizations     Pneumococcal polysaccharide vaccine (PPSV-23) 3/27/2012    SHINGLES VACCINE 3/27/2012      Below and/or attached are the medications your provider expects you to take. Review all of your home medications and newly ordered medications with your provider and/or pharmacist. Follow medication instructions as directed by your provider and/or pharmacist. Please keep your medication list with you and share with your provider. Update the information when medications are discontinued, doses are changed, or new medications (including over-the-counter products) are added; and carry medication information at all times in the event of emergency situations     Allergies:  No Known Allergies          Medications  Valid as of: February 16, 2017 -  9:57 AM    Generic Name Brand Name Tablet Size Instructions for use    ALPRAZolam (Tab) XANAX 0.5 MG Take 1 Tab by mouth 1 time daily as needed for Anxiety.         Atorvastatin Calcium (Tab) LIPITOR 10 MG Take 10 mg by mouth every evening.        Bimatoprost   Place 1 Drop in right eye every day. Right eye        Bimatoprost (Solution) LUMIGAN 0.01 %         Calcium Carbonate   Take 1 Tab by mouth every day.        Cholecalciferol (Cap) Vitamin D-3 1000 UNITS Take 2,000 Units by mouth every day.        Coenzyme Q10 (Cap) Coenzyme Q10 100 MG Take 1 Cap by mouth every day.        Dorzolamide HCl-Timolol Mal   Place 1 Drop in right eye 2 Times a Day.        Estradiol (PATCH WEEKLY) CLIMARA 0.025 MG/24HR Apply 1 Patch to skin as directed every 7 days.        Magnesium   Take 350 mg by mouth every day.        Omega-3 Fatty Acids (Cap) OMEGA 3 FA 1000 MG Take 1,000 mg by mouth every day.        Pantoprazole Sodium (Tablet Delayed Response) PROTONIX 40 MG Take 1 Tab by mouth every day.        Progesterone Micronized (Cap) PROMETRIUM 100 MG Take 100 mg by mouth every day.        Rosuvastatin Calcium (Tab) CRESTOR 5 MG Take 1 Tab by mouth every evening.        .                 Medicines prescribed today were sent to:     [id number] - [place], [place] - [address]    [address] [facility] [address]    Phone: [phone] Fax: [phone]    Open 24 Hours?: No      Medication refill instructions:       If your prescription bottle indicates you have medication refills left, it is not necessary to call your provider’s office. Please contact your pharmacy and they will refill your medication.    If your prescription bottle indicates you do not have any refills left, you may request refills at any time through one of the following ways: The online [employer] system (except Urgent Care), by calling your provider’s office, or by asking your pharmacy to contact your provider’s office with a refill request. Medication refills are processed only during regular business hours and may not be available until the next business day. Your provider may request additional information or to have a  follow-up visit with you prior to refilling your medication.   *Please Note: Medication refills are assigned a new Rx number when refilled electronically. Your pharmacy may indicate that no refills were authorized even though a new prescription for the same medication is available at the pharmacy. Please request the medicine by name with the pharmacy before contacting your provider for a refill.        Your To Do List     Future Labs/Procedures Complete By Expires    COMP METABOLIC PANEL  As directed 2/16/2018    CPK - TOTAL SERUM  As directed 2/16/2018    LIPID PROFILE  As directed 2/16/2018         MyChart Access Code: Activation code not generated  Current Super Vitamin D Status: Active

## 2017-02-16 NOTE — Clinical Note
"     [facility]   [address]  [place] [address]  Phone: [phone]  Fax: [phone]              Lindsey Bella  1945    Encounter Date: 2/16/2017    JOSE Donaldson          PROGRESS NOTE:  Subjective:   Lindsey \"Kyara\"Shayne is a 71 y.o. female who presents today to follow-up on atypical chest pain and hyperlipidemia. She's been complaining of pain in her upper chest bilaterally that is tender to the touch. Lipitor was stopped to see if this changed her chest discomfort. She thinks it may have improved slightly but the discomfort is still there. It is nonexertional.    She also complains of palpitations that she describes as a brief flipping sensation in her chest. This lasted only seconds. It tends to come when she is under stress. She is taking magnesium which she feels helps.     She tells me she has sleep apnea and wears oxygen at night. She was not a candidate for CPAP. She also has some mild COPD and fibromyalgia.    Past Medical History   Diagnosis Date   • Hypertension 3/27/2012   • Hyperlipidemia 3/27/2012   • GERD (gastroesophageal reflux disease) 3/27/2012   • Hot flashes, menopausal 3/27/2012   • Palpitations 3/27/2012   • Panic disorder 3/27/2012   • Glaucoma 3/27/2012   • Anesthesia      PONV   • Urinary bladder disorder      frequency   • Arrhythmia      occasional arrythmia, evaluated by cardiologist and told not an issue   • Heart burn    • Pain      left ankle   • History of fracture of ankle 1/3/2013   • Family history of early CAD 1/3/2014   • Fibromyalgia 2007     Past Surgical History   Procedure Laterality Date   • Cholecystectomy  1998     laparoscopic   • Tonsillectomy  as child   • Appendectomy  as child   • Eye surgery  as child; 2007     right eye injury as child   • Ankle orif  1/3/2013     Performed by sJ Zuniga M.D. at [facility]     Family History   Problem Relation Age of " Onset   • Cancer Mother      colon   • Lung Disease Father    • Cancer Maternal Aunt      colon   • Stroke Brother    • Heart Attack Brother      History   Smoking status   • Former Smoker -- 1.00 packs/day for 20 years   • Quit date: 01/01/2007   Smokeless tobacco   • Never Used     Comment: quit long time ago      No Known Allergies  Outpatient Encounter Prescriptions as of 2/16/2017   Medication Sig Dispense Refill   • DORZOLAMIDE HCL-TIMOLOL MAL OP Place 1 Drop in right eye 2 Times a Day.     • rosuvastatin (CRESTOR) 5 MG Tab Take 1 Tab by mouth every evening. 30 Tab 11   • Calcium Carbonate (CALCIUM 600 PO) Take 1 Tab by mouth every day.     • MAGNESIUM PO Take 350 mg by mouth every day.     • estradiol (CLIMARA) 0.025 MG/24HR PTWK Apply 1 Patch to skin as directed every 7 days.     • docosahexanoic acid (OMEGA 3 FA) 1000 MG CAPS Take 1,000 mg by mouth every day.     • Cholecalciferol (VITAMIN D-3) 1000 UNITS CAPS Take 2,000 Units by mouth every day.     • Coenzyme Q10 (COQ10) 100 MG CAPS Take 1 Cap by mouth every day.     • pantoprazole (PROTONIX) 40 MG TBEC Take 1 Tab by mouth every day. 30 Each 0   • Bimatoprost (LUMIGAN OP) Place 1 Drop in right eye every day. Right eye     • alprazolam (XANAX) 0.5 MG TABS Take 1 Tab by mouth 1 time daily as needed for Anxiety. (Patient taking differently: 1 tablet in the AM and 1 1/2 tablets in the PM) 30 Each 2   • progesterone (PROMETRIUM) 100 MG CAPS Take 100 mg by mouth every day.     • atorvastatin (LIPITOR) 10 MG Tab Take 10 mg by mouth every evening.     • LUMIGAN 0.01 % Solution   4     No facility-administered encounter medications on file as of 2/16/2017.     Review of Systems   Constitutional: Negative for malaise/fatigue.   Respiratory: Negative for cough and shortness of breath.    Cardiovascular: Positive for chest pain (tender to the touch across the upper chest. Minimal change with stopping statin.) and palpitations (last only seconds. Worse with stress.).  "Negative for orthopnea, claudication, leg swelling and PND.   Gastrointestinal: Negative for abdominal pain.   Musculoskeletal: Positive for back pain (chronic low back pain.). Negative for myalgias.   Neurological: Negative for dizziness and weakness.        Objective:   /88 mmHg  Pulse 62  Ht 1.676 m (5' 5.98\")  Wt 70.761 kg (156 lb)  BMI 25.19 kg/m2  SpO2 96%    Physical Exam   Constitutional: She is oriented to person, place, and time. She appears well-developed and well-nourished.   HENT:   Head: Normocephalic.   Eyes: Conjunctivae are normal.   Neck: No JVD present. No thyromegaly present.   Cardiovascular: Normal rate, regular rhythm, S1 normal, S2 normal and normal heart sounds.  Exam reveals no gallop, no S3, no S4 and no friction rub.    No murmur heard.  Pulmonary/Chest: Effort normal and breath sounds normal. No respiratory distress. She has no wheezes. She has no rales.   Abdominal: Soft. Bowel sounds are normal. She exhibits no distension. There is no tenderness.   Musculoskeletal: She exhibits no edema.   Neurological: She is alert and oriented to person, place, and time.   Skin: Skin is warm and dry.   Psychiatric: She has a normal mood and affect.     Results for THO TREVINO (MRN [id number]) as of 2/16/2017 09:27   Ref. Range 2/13/2017 07:16   WBC Latest Ref Range: 4.8-10.8 K/uL 4.1 (L)   RBC Latest Ref Range: 4.20-5.40 M/uL 4.84   Hemoglobin Latest Ref Range: 12.0-16.0 g/dL 14.7   Hematocrit Latest Ref Range: 37.0-47.0 % 46.2   MCV Latest Ref Range: 81.4-97.8 fL 95.5   MCH Latest Ref Range: 27.0-33.0 pg 30.4   MCHC Latest Ref Range: 33.6-35.0 g/dL 31.8 (L)   RDW Latest Ref Range: 35.9-50.0 fL 44.2   Platelet Count Latest Ref Range: 164-446 K/uL 263   MPV Latest Ref Range: 9.0-12.9 fL 10.7   Neutrophils-Polys Latest Ref Range: 44.00-72.00 % 53.60   Neutrophils (Absolute) Latest Ref Range: 2.00-7.15 K/uL 2.19   Lymphocytes Latest Ref Range: 22.00-41.00 % 34.60   Lymphs (Absolute) " Latest Ref Range: 1.00-4.80 K/uL 1.41   Monocytes Latest Ref Range: 0.00-13.40 % 7.10   Monos (Absolute) Latest Ref Range: 0.00-0.85 K/uL 0.29   Eosinophils Latest Ref Range: 0.00-6.90 % 2.70   Eos (Absolute) Latest Ref Range: 0.00-0.51 K/uL 0.11   Basophils Latest Ref Range: 0.00-1.80 % 2.00 (H)   Baso (Absolute) Latest Ref Range: 0.00-0.12 K/uL 0.08   Immature Granulocytes Latest Ref Range: 0.00-0.90 % 0.00   Immature Granulocytes (abs) Latest Ref Range: 0.00-0.11 K/uL 0.00   Nucleated RBC Latest Units: /100 WBC 0.00   NRBC (Absolute) Latest Units: K/uL 0.00   Sodium Latest Ref Range: 135-145 mmol/L 139   Potassium Latest Ref Range: 3.6-5.5 mmol/L 4.1   Chloride Latest Ref Range:  mmol/L 105   Co2 Latest Ref Range: 20-33 mmol/L 27   Anion Gap Latest Ref Range: 0.0-11.9  7.0   Glucose Latest Ref Range: 65-99 mg/dL 86   Bun Latest Ref Range: 8-22 mg/dL 13   Creatinine Latest Ref Range: 0.50-1.40 mg/dL 0.57   GFR If  Latest Ref Range: >60 mL/min/1.73 m 2 >60   GFR If Non  Latest Ref Range: >60 mL/min/1.73 m 2 >60   Calcium Latest Ref Range: 8.5-10.5 mg/dL 8.9   AST(SGOT) Latest Ref Range: 12-45 U/L 17   ALT(SGPT) Latest Ref Range: 2-50 U/L 18   Alkaline Phosphatase Latest Ref Range: 30-99 U/L 49   Total Bilirubin Latest Ref Range: 0.1-1.5 mg/dL 0.5   Albumin Latest Ref Range: 3.2-4.9 g/dL 4.0   Total Protein Latest Ref Range: 6.0-8.2 g/dL 6.5   Globulin Latest Ref Range: 1.9-3.5 g/dL 2.5   A-G Ratio Latest Units: g/dL 1.6   Cholesterol,Tot Latest Ref Range: 100-199 mg/dL 234 (H)   Triglycerides Latest Ref Range: 0-149 mg/dL 83   HDL Latest Ref Range: >=40 mg/dL 47   LDL Latest Ref Range: <100 mg/dL 170 (H)   25-Hydroxy   Vitamin D 25 Latest Ref Range:  ng/mL 42   TSH Latest Ref Range: 0.300-3.700 uIU/mL 2.190   Hepatitis C Antibody Latest Ref Range: Negative  Negative     Stress echo in 2014 was negative for ischemia. She did have PVCs and PACs.    Coronary calcium score in  2014 was 14 in the RCA distribution.    Assessment:     1. Mixed hyperlipidemia  rosuvastatin (CRESTOR) 5 MG Tab    COMP METABOLIC PANEL    LIPID PROFILE    CPK - TOTAL SERUM   2. Essential hypertension, benign     3. Palpitations     4. Atypical chest pain     5. Fibromyalgia         Medical Decision Making:  Today's Assessment / Status / Plan:     Hyperlipidemia: Her LDL in particular is very elevated when she is off statin. She is willing to try Crestor 5 mg as she realizes she needs a reduction in her lipids. I will check CMP, lipids and CPK in 3 months. I do not think her chest discomfort is due to statin.    Hypertension: She tells me that she has white coat hypertension and her blood pressure is elevated in the office. Her blood pressure at home as been running 130/70. She took her blood pressure cuff to her primary care and it correlated. She is not on any antihypertensives and may not need them.    Palpitations: She has PACs and PVCs on her stress test. It sounds like she may be having ectopy for her palpitations. I have reassured her that these are benign.    Atypical chest pain: She is tender over her pectoralis minor muscles bilaterally. They're tender to the touch today and she has been off statin for a few months. This is clearly muscular chest discomfort and not cardiac. She should not require any other stress testing as she had a stress echo and a coronary calcium scan which were excellent.    Fibromyalgia: This is probably the reason she has the chest discomfort and other aches. It is probably not related to statin.    She will do lab work in 3 months to monitor her lipids. She will follow-up in one year with Dr. Castanon, sooner if problems.    Collaborating Provider: Dr. Castanon.        No Recipients

## 2017-05-08 ENCOUNTER — HOSPITAL ENCOUNTER (OUTPATIENT)
Dept: LAB | Facility: MEDICAL CENTER | Age: 72
End: 2017-05-08
Attending: FAMILY MEDICINE
Payer: MEDICARE

## 2017-05-08 LAB
ALBUMIN SERPL BCP-MCNC: 4.2 G/DL (ref 3.2–4.9)
ALBUMIN/GLOB SERPL: 1.8 G/DL
ALP SERPL-CCNC: 48 U/L (ref 30–99)
ALT SERPL-CCNC: 17 U/L (ref 2–50)
ANION GAP SERPL CALC-SCNC: 5 MMOL/L (ref 0–11.9)
AST SERPL-CCNC: 16 U/L (ref 12–45)
BASOPHILS # BLD AUTO: 1.4 % (ref 0–1.8)
BASOPHILS # BLD: 0.06 K/UL (ref 0–0.12)
BILIRUB SERPL-MCNC: 0.5 MG/DL (ref 0.1–1.5)
BUN SERPL-MCNC: 13 MG/DL (ref 8–22)
CALCIUM SERPL-MCNC: 9.3 MG/DL (ref 8.5–10.5)
CHLORIDE SERPL-SCNC: 106 MMOL/L (ref 96–112)
CHOLEST SERPL-MCNC: 271 MG/DL (ref 100–199)
CO2 SERPL-SCNC: 30 MMOL/L (ref 20–33)
CREAT SERPL-MCNC: 0.63 MG/DL (ref 0.5–1.4)
CRP SERPL HS-MCNC: 1.8 MG/L (ref 0–7.5)
EOSINOPHIL # BLD AUTO: 0.12 K/UL (ref 0–0.51)
EOSINOPHIL NFR BLD: 2.7 % (ref 0–6.9)
ERYTHROCYTE [DISTWIDTH] IN BLOOD BY AUTOMATED COUNT: 45.7 FL (ref 35.9–50)
GFR SERPL CREATININE-BSD FRML MDRD: >60 ML/MIN/1.73 M 2
GLOBULIN SER CALC-MCNC: 2.3 G/DL (ref 1.9–3.5)
GLUCOSE SERPL-MCNC: 93 MG/DL (ref 65–99)
HCT VFR BLD AUTO: 45.4 % (ref 37–47)
HDLC SERPL-MCNC: 53 MG/DL
HGB BLD-MCNC: 14.8 G/DL (ref 12–16)
IMM GRANULOCYTES # BLD AUTO: 0.01 K/UL (ref 0–0.11)
IMM GRANULOCYTES NFR BLD AUTO: 0.2 % (ref 0–0.9)
LDLC SERPL CALC-MCNC: 197 MG/DL
LYMPHOCYTES # BLD AUTO: 1.3 K/UL (ref 1–4.8)
LYMPHOCYTES NFR BLD: 29.7 % (ref 22–41)
MCH RBC QN AUTO: 30.8 PG (ref 27–33)
MCHC RBC AUTO-ENTMCNC: 32.6 G/DL (ref 33.6–35)
MCV RBC AUTO: 94.6 FL (ref 81.4–97.8)
MONOCYTES # BLD AUTO: 0.33 K/UL (ref 0–0.85)
MONOCYTES NFR BLD AUTO: 7.6 % (ref 0–13.4)
NEUTROPHILS # BLD AUTO: 2.55 K/UL (ref 2–7.15)
NEUTROPHILS NFR BLD: 58.4 % (ref 44–72)
NRBC # BLD AUTO: 0 K/UL
NRBC BLD AUTO-RTO: 0 /100 WBC
PLATELET # BLD AUTO: 259 K/UL (ref 164–446)
PMV BLD AUTO: 10.5 FL (ref 9–12.9)
POTASSIUM SERPL-SCNC: 3.9 MMOL/L (ref 3.6–5.5)
PROT SERPL-MCNC: 6.5 G/DL (ref 6–8.2)
RBC # BLD AUTO: 4.8 M/UL (ref 4.2–5.4)
SODIUM SERPL-SCNC: 141 MMOL/L (ref 135–145)
TRIGL SERPL-MCNC: 105 MG/DL (ref 0–149)
TSH SERPL DL<=0.005 MIU/L-ACNC: 1.89 UIU/ML (ref 0.3–3.7)
WBC # BLD AUTO: 4.4 K/UL (ref 4.8–10.8)

## 2017-05-08 PROCEDURE — 80061 LIPID PANEL: CPT

## 2017-05-08 PROCEDURE — 85025 COMPLETE CBC W/AUTO DIFF WBC: CPT

## 2017-05-08 PROCEDURE — 80053 COMPREHEN METABOLIC PANEL: CPT

## 2017-05-08 PROCEDURE — 84443 ASSAY THYROID STIM HORMONE: CPT

## 2017-05-08 PROCEDURE — 86141 C-REACTIVE PROTEIN HS: CPT

## 2017-05-08 PROCEDURE — 36415 COLL VENOUS BLD VENIPUNCTURE: CPT

## 2017-08-14 ENCOUNTER — OFFICE VISIT (OUTPATIENT)
Dept: PULMONOLOGY | Facility: HOSPICE | Age: 72
End: 2017-08-14
Payer: MEDICARE

## 2017-08-14 VITALS
DIASTOLIC BLOOD PRESSURE: 66 MMHG | BODY MASS INDEX: 23.98 KG/M2 | SYSTOLIC BLOOD PRESSURE: 106 MMHG | RESPIRATION RATE: 16 BRPM | HEART RATE: 70 BPM | TEMPERATURE: 97.2 F | OXYGEN SATURATION: 96 % | HEIGHT: 66 IN | WEIGHT: 149.2 LBS

## 2017-08-14 DIAGNOSIS — Z87.891 FORMER SMOKER: ICD-10-CM

## 2017-08-14 DIAGNOSIS — J44.9 CHRONIC OBSTRUCTIVE PULMONARY DISEASE, UNSPECIFIED COPD TYPE (HCC): ICD-10-CM

## 2017-08-14 DIAGNOSIS — G47.33 OSA (OBSTRUCTIVE SLEEP APNEA): ICD-10-CM

## 2017-08-14 PROCEDURE — 99202 OFFICE O/P NEW SF 15 MIN: CPT | Performed by: INTERNAL MEDICINE

## 2017-08-14 NOTE — MR AVS SNAPSHOT
"Lindsey Bella   2017 9:20 AM   Office Visit   MRN: [id number]    Department:  [facility]   Dept Phone:  [phone]    Description:  Female : 1945   Provider:  Nathalie Bland M.D.           Reason for Visit     Establish Care Self referral for COPD.      Allergies as of 2017     No Known Allergies      You were diagnosed with     Chronic obstructive pulmonary disease, unspecified COPD type (CMS-HCC)   [8950059]       Former smoker   [351991]       JOSSE (obstructive sleep apnea)   [488258]         Vital Signs     Blood Pressure Pulse Temperature Respirations Height Weight    106/66 mmHg 70 36.2 °C (97.2 °F) 16 1.676 m (5' 5.98\") 67.677 kg (149 lb 3.2 oz)    Body Mass Index Oxygen Saturation Smoking Status             24.09 kg/m2 96% Former Smoker         Basic Information     Date Of Birth Sex Race Ethnicity Preferred Language    1945 Female White Non- English      Problem List              ICD-10-CM Priority Class Noted - Resolved    Essential hypertension, benign I10   3/27/2012 - Present    Mixed hyperlipidemia E78.2   3/27/2012 - Present    GERD (gastroesophageal reflux disease) (Chronic) K21.9   3/27/2012 - Present    Family history of colon cancer (Chronic) Z80.0   3/27/2012 - Present    Allergic rhinitis (Chronic)    3/27/2012 - Present    Palpitations (Chronic) R00.2   3/27/2012 - Present    Panic disorder (Chronic) F41.0   3/27/2012 - Present    Routine health maintenance (Chronic) Z00.00   3/27/2012 - Present    Glaucoma (Chronic) H40.9   3/27/2012 - Present    Personal history of fibromyalgia Z87.39   2012 - Present    History of fracture of ankle Z87.81   1/3/2013 - Present    Abnormal stress ECG with treadmill R94.39   2014 - Present    Atrial premature beats I49.1   2014 - Present    Ventricular premature beats I49.3   2014 - Present    Atypical chest pain R07.89   2017 - Present    Pulmonary emphysema (CMS-HCC) J43.9   " 1/5/2017 - Present    COPD (chronic obstructive pulmonary disease) (CMS-[facility]) J44.9   1/5/2017 - Present    JOSSE (obstructive sleep apnea) G47.33   1/5/2017 - Present    Fibromyalgia M79.7   1/5/2017 - Present      Health Maintenance        Date Due Completion Dates    IMM DTaP/Tdap/Td Vaccine (1 - Tdap) 7/11/1964 ---    PAP SMEAR 7/11/1966 ---    IMM PNEUMOCOCCAL 65+ (ADULT) LOW/MEDIUM RISK SERIES (2 of 2 - PCV13) 3/27/2013 3/27/2012    MAMMOGRAM 4/29/2014 4/29/2013 (Done), 4/29/2013 (Done), 3/14/2012 (Done)    Override on 4/29/2013: Done    Override on 4/29/2013: Done    Override on 3/14/2012: Done (Dr Adames)    BONE DENSITY 3/14/2017 3/14/2012 (Done)    Override on 3/14/2012: Done    IMM INFLUENZA (1) 9/1/2017 ---    COLONOSCOPY 10/14/2017 10/14/2012 (Done)    Override on 10/14/2012: Done (repeat in 5 years/family history of colon cancer/Dr Edwards)            Current Immunizations     Influenza TIV (IM) 10/1/2016    Pneumococcal polysaccharide vaccine (PPSV-23) 3/27/2012    SHINGLES VACCINE 3/27/2012      Below and/or attached are the medications your provider expects you to take. Review all of your home medications and newly ordered medications with your provider and/or pharmacist. Follow medication instructions as directed by your provider and/or pharmacist. Please keep your medication list with you and share with your provider. Update the information when medications are discontinued, doses are changed, or new medications (including over-the-counter products) are added; and carry medication information at all times in the event of emergency situations     Allergies:  No Known Allergies          Medications  Valid as of: August 14, 2017 - 10:21 AM    Generic Name Brand Name Tablet Size Instructions for use    ALPRAZolam (Tab) XANAX 0.5 MG Take 1 Tab by mouth 1 time daily as needed for Anxiety.        Atorvastatin Calcium (Tab) LIPITOR 10 MG Take 10 mg by mouth every evening.        Bimatoprost   Place 1 Drop in  right eye every day. Right eye        Bimatoprost (Solution) LUMIGAN 0.01 %         Calcium Carbonate   Take 1 Tab by mouth every day.        Cholecalciferol (Cap) Vitamin D-3 1000 UNITS Take 2,000 Units by mouth every day.        Coenzyme Q10 (Cap) Coenzyme Q10 100 MG Take 1 Cap by mouth every day.        Dorzolamide HCl-Timolol Mal   Place 1 Drop in right eye 2 Times a Day.        Estradiol (PATCH WEEKLY) CLIMARA 0.025 MG/24HR Apply 1 Patch to skin as directed every 7 days.        Magnesium   Take 350 mg by mouth every day.        Omega-3 Fatty Acids (Cap) OMEGA 3 FA 1000 MG Take 1,000 mg by mouth every day.        Omega-3 Fatty Acids   Take 200 mg by mouth every day.        Pantoprazole Sodium (Tablet Delayed Response) PROTONIX 40 MG Take 1 Tab by mouth every day.        Progesterone Micronized (Cap) PROMETRIUM 100 MG Take 100 mg by mouth every day.        Rosuvastatin Calcium (Tab) CRESTOR 5 MG Take 1 Tab by mouth every evening.        .                 Medicines prescribed today were sent to:     [id number] - [place], [place] - [address]    [address] [address]    Phone: [phone] Fax: [phone]    Open 24 Hours?: No      Medication refill instructions:       If your prescription bottle indicates you have medication refills left, it is not necessary to call your provider’s office. Please contact your pharmacy and they will refill your medication.    If your prescription bottle indicates you do not have any refills left, you may request refills at any time through one of the following ways: The online [employer] system (except Urgent Care), by calling your provider’s office, or by asking your pharmacy to contact your provider’s office with a refill request. Medication refills are processed only during regular business hours and may not be available until the next business day. Your provider may request additional information or to have a follow-up visit with you prior to refilling your  medication.   *Please Note: Medication refills are assigned a new Rx number when refilled electronically. Your pharmacy may indicate that no refills were authorized even though a new prescription for the same medication is available at the pharmacy. Please request the medicine by name with the pharmacy before contacting your provider for a refill.           [email or web address] Access Code: Activation code not generated  Current Pain Doctor Status: Active

## 2017-08-15 NOTE — PROGRESS NOTES
Chief Complaint   Patient presents with   • Establish Care     Self referral for COPD.       HPI: This patient is a 72 y.o. Female who presents to establish pulmonary care. She was formerly followed by [facility] for COPD and obstructive sleep apnea. Pulmonary function testing in 2016 showed mild COPD with FEV1 1.97 L or 81%, FEV1/FVC 71%, DLCO 71%. She has declined long-acting inhaled bronchodilators. She remains physically active, and denies any significant exertional dyspnea, cough, wheezing or chest tightness. She denies AECOPD over the past year. She quit smoking in 2007.  She had a diagnostic polysomnogram in 2013 showing mild JOSSE with AHI 9.6 and uses nocturnal oxygen. She denies excessive daytime hypersomnolence.      Past Medical History   Diagnosis Date   • Hypertension 3/27/2012   • Hyperlipidemia 3/27/2012   • GERD (gastroesophageal reflux disease) 3/27/2012   • Hot flashes, menopausal 3/27/2012   • Palpitations 3/27/2012   • Panic disorder 3/27/2012   • Glaucoma 3/27/2012   • Anesthesia      PONV   • Urinary bladder disorder      frequency   • Arrhythmia      occasional arrythmia, evaluated by cardiologist and told not an issue   • Heart burn    • Pain      left ankle   • History of fracture of ankle 1/3/2013   • Family history of early CAD 1/3/2014   • Fibromyalgia 2007   • Chickenpox    • Back pain    • COPD (chronic obstructive pulmonary disease) (CMS-[facility])    • Nasal drainage    • Sleep apnea        Social History     Social History   • Marital Status:      Spouse Name: N/A   • Number of Children: N/A   • Years of Education: N/A     Occupational History   • Not on file.     Social History Main Topics   • Smoking status: Former Smoker -- 1.00 packs/day for 20 years     Types: Cigarettes     Quit date: 01/01/2007   • Smokeless tobacco: Never Used      Comment: quit long time ago    • Alcohol Use: Yes      Comment: wine with dinner   • Drug Use: No   • Sexual Activity: Not on file       Comment:  /lives by slef     Other Topics Concern   • Not on file     Social History Narrative       Family History   Problem Relation Age of Onset   • Cancer Mother      colon   • Colon Cancer Mother    • Lung Disease Father    • Cancer Maternal Aunt      colon   • Heart Attack Brother    • Alcohol abuse Brother        Current Outpatient Prescriptions on File Prior to Visit   Medication Sig Dispense Refill   • DORZOLAMIDE HCL-TIMOLOL MAL OP Place 1 Drop in right eye 2 Times a Day.     • Calcium Carbonate (CALCIUM 600 PO) Take 1 Tab by mouth every day.     • MAGNESIUM PO Take 350 mg by mouth every day.     • estradiol (CLIMARA) 0.025 MG/24HR PTWK Apply 1 Patch to skin as directed every 7 days.     • Cholecalciferol (VITAMIN D-3) 1000 UNITS CAPS Take 2,000 Units by mouth every day.     • Coenzyme Q10 (COQ10) 100 MG CAPS Take 1 Cap by mouth every day.     • pantoprazole (PROTONIX) 40 MG TBEC Take 1 Tab by mouth every day. 30 Each 0   • Bimatoprost (LUMIGAN OP) Place 1 Drop in right eye every day. Right eye     • alprazolam (XANAX) 0.5 MG TABS Take 1 Tab by mouth 1 time daily as needed for Anxiety. (Patient taking differently: 1 tablet in the AM and 1 1/2 tablets in the PM) 30 Each 2   • rosuvastatin (CRESTOR) 5 MG Tab Take 1 Tab by mouth every evening. 30 Tab 11   • atorvastatin (LIPITOR) 10 MG Tab Take 10 mg by mouth every evening.     • LUMIGAN 0.01 % Solution   4   • docosahexanoic acid (OMEGA 3 FA) 1000 MG CAPS Take 1,000 mg by mouth every day.     • progesterone (PROMETRIUM) 100 MG CAPS Take 100 mg by mouth every day.       No current facility-administered medications on file prior to visit.       Allergies: Review of patient's allergies indicates no known allergies.    ROS:   Constitutional: Denies fevers, chills, night sweats, fatigue or weight loss  Eyes: Denies vision loss, pain, drainage, double vision  Ears, Nose, Throat: Denies earache, difficulty hearing, tinnitus, nasal congestion,  "hoarseness  Cardiovascular: Denies chest pain, tightness, palpitations, orthopnea or edema  Respiratory as in history of present illness  Sleep: Denies daytime sleepiness, snoring, +apneas, denies insomnia, morning headaches  GI: Denies heartburn, dysphagia, nausea, abdominal pain, diarrhea or constipation  : Denies frequent urination, hematuria, discharge or painful urination  Musculoskeletal: Denies back pain, painful joints, sore muscles  Neurological: Denies weakness or headaches  Skin: No rashes    Blood pressure 106/66, pulse 70, temperature 36.2 °C (97.2 °F), resp. rate 16, height 1.676 m (5' 5.98\"), weight 67.677 kg (149 lb 3.2 oz), SpO2 96 %.    Physical Exam:  Appearance: Well-nourished, well-developed, in no acute distress  HEENT: Normocephalic, atraumatic, white sclera, PERRLA, oropharynx clear, Mallampati 3   Neck: No adenopathy or masses  Respiratory: no intercostal retractions or accessory muscle use  Lungs auscultation: Clear to auscultation bilaterally  Cardiovascular: Regular rate rhythm. No murmurs, rubs or gallops.  No LE edema  Abdomen: soft, nondistended  Gait: Normal  Digits: No clubbing, cyanosis  Motor: No focal deficits  Orientation: Oriented to time, person and place    Diagnosis:  1. Chronic obstructive pulmonary disease, unspecified COPD type (CMS-[facility])     2. Former smoker     3. JOSSE (obstructive sleep apnea)         Plan:  The patient has mild COPD per GOLD guidelines, with minimal respiratory symptomatology. She quit smoking in 2007. Recommend short acting rescue inhaler prn. She had tried albuterol the past which cause palpitations. Consequently recommend Atrovent HFA prn.  Prevnar 13 recommended.  Alpha-1 antitrypsin level, performed through a test kit today in clinic.  Annual pulmonary function testing.  Return in about 6 months (around 2/14/2018).        "

## 2017-08-28 ENCOUNTER — TELEPHONE (OUTPATIENT)
Dept: PULMONOLOGY | Facility: HOSPICE | Age: 72
End: 2017-08-28

## 2017-08-28 NOTE — TELEPHONE ENCOUNTER
Pt had alpha1 testing done at NP visit on 8/14/17. Pt is wanting to know the result or the time frame it will take to get the results. Please call and advise [phone]

## 2017-08-29 ENCOUNTER — TELEPHONE (OUTPATIENT)
Dept: PULMONOLOGY | Facility: HOSPICE | Age: 72
End: 2017-08-29

## 2017-08-29 ENCOUNTER — HOSPITAL ENCOUNTER (OUTPATIENT)
Dept: LAB | Facility: MEDICAL CENTER | Age: 72
End: 2017-08-29
Attending: FAMILY MEDICINE
Payer: MEDICARE

## 2017-08-29 DIAGNOSIS — J44.9 CHRONIC OBSTRUCTIVE PULMONARY DISEASE, UNSPECIFIED COPD TYPE (HCC): ICD-10-CM

## 2017-08-29 LAB
CHOLEST SERPL-MCNC: 234 MG/DL (ref 100–199)
FASTING STATUS PATIENT QL REPORTED: NORMAL
HDLC SERPL-MCNC: 52 MG/DL
LDLC SERPL CALC-MCNC: 161 MG/DL
TRIGL SERPL-MCNC: 105 MG/DL (ref 0–149)

## 2017-08-29 PROCEDURE — 80061 LIPID PANEL: CPT

## 2017-08-29 PROCEDURE — 36415 COLL VENOUS BLD VENIPUNCTURE: CPT

## 2017-08-29 NOTE — TELEPHONE ENCOUNTER
MEDICATION PRIOR AUTHORIZATION NEEDED:    1. Name of Medication: Atrovent HFA 17 mcg    2. Requested By (Name of Pharmacy): [facility]     3. Is insurance on file current? yes    4. What is the name & phone number of the 3rd party payor? [facility] [phone]

## 2017-08-30 ENCOUNTER — TELEPHONE (OUTPATIENT)
Dept: PULMONOLOGY | Facility: HOSPICE | Age: 72
End: 2017-08-30

## 2017-08-30 RX ORDER — LEVALBUTEROL TARTRATE 45 UG/1
2 AEROSOL, METERED ORAL EVERY 4 HOURS PRN
Qty: 1 INHALER | Refills: 5 | Status: SHIPPED | OUTPATIENT
Start: 2017-08-30

## 2017-08-30 NOTE — TELEPHONE ENCOUNTER
DOCUMENTATION OF PRIOR AUTH STATUS    1. Medication name and dose: Atrovent HFA 17 mcg    2. Name and Phone # of Prescription coverage company: [employer] [phone]    3. Date Prior Auth was submitted: 8/29/2017    4. What information was given to obtain insurance decision: Clinical notes    5. Prior Auth letter Approved or Denied: Denied    6. Pharmacy notified: No    7. Patient notified: No        Atrovent HFA was denied.  [facility] wants the pt to use Spiriva first.  Atrovent was supposed to be used as a rescue inhaler, so that is how I did the prior auth.  Should we have the pt try Xopenex HFA instead?  Dx is COPD.  Please advise, thank you.

## 2017-08-30 NOTE — TELEPHONE ENCOUNTER
I called pt to f/v with denial.   pt now is requesting Albuterol. If she cannot get Atrovent is this possible?

## 2017-08-30 NOTE — TELEPHONE ENCOUNTER
I called the pt back and let her know that the Atrovent was denied and that we can try to get Xopenex for her.  I told her that it may need a prior auth too.  She was fine with that as she does not want albuterol because it made her anxious and nervous when she used it before.  I told her that I will call when I get an answer back from insurance.

## 2017-08-30 NOTE — TELEPHONE ENCOUNTER
Pt called this morning upset because she wanted answers on her Auth. From [facility]. I called [employer] and found the Pt. Is denied due to needing additional review by the provider.    Can you call pt and let them know why they are Denied?

## 2017-08-30 NOTE — TELEPHONE ENCOUNTER
No, if you read the note I put in, she gets nervous from Albuterol and needs something that doesn't do that to her. I spoke to her and she needs Xopenex.  Please route to APN's.  Thank you.

## 2017-08-31 ENCOUNTER — TELEPHONE (OUTPATIENT)
Dept: PULMONOLOGY | Facility: HOSPICE | Age: 72
End: 2017-08-31

## 2017-08-31 DIAGNOSIS — R09.02 HYPOXIA: ICD-10-CM

## 2017-08-31 DIAGNOSIS — J44.9 CHRONIC OBSTRUCTIVE PULMONARY DISEASE, UNSPECIFIED COPD TYPE (HCC): ICD-10-CM

## 2017-08-31 NOTE — TELEPHONE ENCOUNTER
MEDICATION PRIOR AUTHORIZATION NEEDED:    1. Name of Medication: Xopenex HFA 45 mcg    2. Requested By (Name of Pharmacy): [facility]     3. Is insurance on file current? yes    4. What is the name & phone number of the 3rd party payor? [facility] [phone]

## 2017-08-31 NOTE — TELEPHONE ENCOUNTER
Called and left the pt a  mssg telling her that the Xopenex HFA is approved and that she can pick it up at the pharmacy today.

## 2017-08-31 NOTE — TELEPHONE ENCOUNTER
Patient has traveled to [place] without her travel unit that she uses at night. Nocturnal Oxygen 2.5 LPM. Order needed for [facility] in [phone] so she can have for this evening. She doing a rental while she away from Home. (I placed order-pending)

## 2017-08-31 NOTE — TELEPHONE ENCOUNTER
"Patient aware order was faxed as requested. I double checked and  \"yes\" they did receive order at [facility] peer patient.  "

## 2017-08-31 NOTE — TELEPHONE ENCOUNTER
DOCUMENTATION OF PRIOR AUTH STATUS    1. Medication name and dose: Xopenex HFA 45 mcg    2. Name and Phone # of Prescription coverage company: [employer] [phone]    3. Date Prior Auth was submitted: 8/30/2017    4. What information was given to obtain insurance decision: Clinical notes    5. Prior Auth letter Approved or Denied: Approved through 12/31/2017    6. Pharmacy notified: Yes    7. Patient notified: Yes

## 2018-03-13 ENCOUNTER — HOSPITAL ENCOUNTER (OUTPATIENT)
Dept: LAB | Facility: MEDICAL CENTER | Age: 73
End: 2018-03-13
Attending: FAMILY MEDICINE
Payer: MEDICARE

## 2018-03-13 LAB
25(OH)D3 SERPL-MCNC: 33 NG/ML (ref 30–100)
ALBUMIN SERPL BCP-MCNC: 4.4 G/DL (ref 3.2–4.9)
ALBUMIN/GLOB SERPL: 1.8 G/DL
ALP SERPL-CCNC: 46 U/L (ref 30–99)
ALT SERPL-CCNC: 21 U/L (ref 2–50)
ANION GAP SERPL CALC-SCNC: 8 MMOL/L (ref 0–11.9)
AST SERPL-CCNC: 20 U/L (ref 12–45)
BASOPHILS # BLD AUTO: 1.6 % (ref 0–1.8)
BASOPHILS # BLD: 0.07 K/UL (ref 0–0.12)
BILIRUB SERPL-MCNC: 0.6 MG/DL (ref 0.1–1.5)
BUN SERPL-MCNC: 14 MG/DL (ref 8–22)
CALCIUM SERPL-MCNC: 8.9 MG/DL (ref 8.5–10.5)
CHLORIDE SERPL-SCNC: 104 MMOL/L (ref 96–112)
CHOLEST SERPL-MCNC: 255 MG/DL (ref 100–199)
CO2 SERPL-SCNC: 28 MMOL/L (ref 20–33)
CREAT SERPL-MCNC: 0.57 MG/DL (ref 0.5–1.4)
EOSINOPHIL # BLD AUTO: 0.1 K/UL (ref 0–0.51)
EOSINOPHIL NFR BLD: 2.3 % (ref 0–6.9)
ERYTHROCYTE [DISTWIDTH] IN BLOOD BY AUTOMATED COUNT: 47.5 FL (ref 35.9–50)
GLOBULIN SER CALC-MCNC: 2.4 G/DL (ref 1.9–3.5)
GLUCOSE SERPL-MCNC: 88 MG/DL (ref 65–99)
HCT VFR BLD AUTO: 46.5 % (ref 37–47)
HDLC SERPL-MCNC: 55 MG/DL
HGB BLD-MCNC: 15 G/DL (ref 12–16)
IMM GRANULOCYTES # BLD AUTO: 0.01 K/UL (ref 0–0.11)
IMM GRANULOCYTES NFR BLD AUTO: 0.2 % (ref 0–0.9)
LDLC SERPL CALC-MCNC: 180 MG/DL
LYMPHOCYTES # BLD AUTO: 1.23 K/UL (ref 1–4.8)
LYMPHOCYTES NFR BLD: 28.8 % (ref 22–41)
MCH RBC QN AUTO: 30.6 PG (ref 27–33)
MCHC RBC AUTO-ENTMCNC: 32.3 G/DL (ref 33.6–35)
MCV RBC AUTO: 94.9 FL (ref 81.4–97.8)
MONOCYTES # BLD AUTO: 0.36 K/UL (ref 0–0.85)
MONOCYTES NFR BLD AUTO: 8.4 % (ref 0–13.4)
NEUTROPHILS # BLD AUTO: 2.5 K/UL (ref 2–7.15)
NEUTROPHILS NFR BLD: 58.7 % (ref 44–72)
NRBC # BLD AUTO: 0 K/UL
NRBC BLD-RTO: 0 /100 WBC
PLATELET # BLD AUTO: 266 K/UL (ref 164–446)
PMV BLD AUTO: 10.4 FL (ref 9–12.9)
POTASSIUM SERPL-SCNC: 4.3 MMOL/L (ref 3.6–5.5)
PROT SERPL-MCNC: 6.8 G/DL (ref 6–8.2)
RBC # BLD AUTO: 4.9 M/UL (ref 4.2–5.4)
SODIUM SERPL-SCNC: 140 MMOL/L (ref 135–145)
TRIGL SERPL-MCNC: 100 MG/DL (ref 0–149)
TSH SERPL DL<=0.005 MIU/L-ACNC: 2.06 UIU/ML (ref 0.38–5.33)
WBC # BLD AUTO: 4.3 K/UL (ref 4.8–10.8)

## 2018-03-13 PROCEDURE — 82306 VITAMIN D 25 HYDROXY: CPT

## 2018-03-13 PROCEDURE — 80053 COMPREHEN METABOLIC PANEL: CPT

## 2018-03-13 PROCEDURE — 36415 COLL VENOUS BLD VENIPUNCTURE: CPT

## 2018-03-13 PROCEDURE — 85025 COMPLETE CBC W/AUTO DIFF WBC: CPT

## 2018-03-13 PROCEDURE — 80061 LIPID PANEL: CPT

## 2018-03-13 PROCEDURE — 84443 ASSAY THYROID STIM HORMONE: CPT

## 2018-04-03 ENCOUNTER — TELEPHONE (OUTPATIENT)
Dept: PULMONOLOGY | Facility: HOSPICE | Age: 73
End: 2018-04-03

## 2018-04-03 NOTE — TELEPHONE ENCOUNTER
Patient is moving out of state.  [employer] will assist patient with transferring her service to another company so she will not have to requalify when she gets to where she is going and will not have to go without oxygen at night

## 2021-01-15 DIAGNOSIS — Z23 NEED FOR VACCINATION: ICD-10-CM

## 2023-12-14 ENCOUNTER — OFFICE VISIT (OUTPATIENT)
Facility: LOCATION | Age: 78
End: 2023-12-14
Payer: MEDICARE

## 2023-12-14 DIAGNOSIS — H25.092 OTHER AGE-RELATED INCIPIENT CATARACT, LEFT EYE: ICD-10-CM

## 2023-12-14 DIAGNOSIS — H40.31X3 GLAUCOMA SECONDARY TO EYE TRAUMA, RIGHT EYE, SEVERE STAGE: ICD-10-CM

## 2023-12-14 DIAGNOSIS — H40.012 OPEN ANGLE WITH BORDERLINE FINDINGS, LOW RISK, LEFT EYE: Primary | ICD-10-CM

## 2023-12-14 PROCEDURE — 99214 OFFICE O/P EST MOD 30 MIN: CPT | Performed by: OPHTHALMOLOGY

## 2023-12-14 PROCEDURE — 92133 CPTRZD OPH DX IMG PST SGM ON: CPT | Performed by: OPHTHALMOLOGY

## 2023-12-14 RX ORDER — DORZOLAMIDE HYDROCHLORIDE AND TIMOLOL MALEATE 20; 5 MG/ML; MG/ML
SOLUTION/ DROPS OPHTHALMIC
Qty: 5 | Refills: 6 | Status: INACTIVE
Start: 2023-12-14 | End: 2024-01-23

## 2023-12-14 RX ORDER — LATANOPROST 50 UG/ML
0.005 % SOLUTION OPHTHALMIC
Qty: 2.5 | Refills: 6 | Status: INACTIVE
Start: 2023-12-14 | End: 2024-12-12

## 2023-12-14 ASSESSMENT — INTRAOCULAR PRESSURE
OS: 18
OD: 30

## 2025-06-17 ENCOUNTER — OFFICE VISIT (OUTPATIENT)
Facility: LOCATION | Age: 80
End: 2025-06-17
Payer: MEDICARE

## 2025-06-17 DIAGNOSIS — H40.012 OPEN ANGLE WITH BORDERLINE FINDINGS, LOW RISK, LEFT EYE: Primary | ICD-10-CM

## 2025-06-17 DIAGNOSIS — H40.31X3 GLAUCOMA SECONDARY TO EYE TRAUMA, RIGHT EYE, SEVERE STAGE: ICD-10-CM

## 2025-06-17 DIAGNOSIS — H25.12 AGE-RELATED NUCLEAR CATARACT, LEFT EYE: ICD-10-CM

## 2025-06-17 PROCEDURE — 99213 OFFICE O/P EST LOW 20 MIN: CPT | Performed by: OPHTHALMOLOGY

## 2025-06-17 PROCEDURE — 92133 CPTRZD OPH DX IMG PST SGM ON: CPT | Performed by: OPHTHALMOLOGY

## 2025-06-17 ASSESSMENT — INTRAOCULAR PRESSURE
OS: 16
OD: 38